# Patient Record
Sex: FEMALE | HISPANIC OR LATINO | URBAN - METROPOLITAN AREA
[De-identification: names, ages, dates, MRNs, and addresses within clinical notes are randomized per-mention and may not be internally consistent; named-entity substitution may affect disease eponyms.]

---

## 2018-04-16 ENCOUNTER — OFFICE (OUTPATIENT)
Dept: URBAN - METROPOLITAN AREA CLINIC 101 | Facility: CLINIC | Age: 63
End: 2018-04-16

## 2018-04-16 VITALS
WEIGHT: 140 LBS | HEIGHT: 62 IN | SYSTOLIC BLOOD PRESSURE: 144 MMHG | HEART RATE: 61 BPM | TEMPERATURE: 97.9 F | DIASTOLIC BLOOD PRESSURE: 86 MMHG

## 2018-04-16 DIAGNOSIS — B19.10 UNSPECIFIED VIRAL HEPATITIS B WITHOUT HEPATIC COMA: ICD-10-CM

## 2018-04-16 DIAGNOSIS — R19.4 CHANGE IN BOWEL HABIT: ICD-10-CM

## 2018-04-16 DIAGNOSIS — R10.13 EPIGASTRIC PAIN: ICD-10-CM

## 2018-04-16 DIAGNOSIS — K59.00 CONSTIPATION, UNSPECIFIED: ICD-10-CM

## 2018-04-16 PROCEDURE — 99244 OFF/OP CNSLTJ NEW/EST MOD 40: CPT

## 2018-04-16 NOTE — SERVICEHPINOTES
FARZANEH TEJEDA   is a   63   year old    female who is being seen in consultation at the request of   GUADALUPE COTE   for epigastric pain radiating to RUQ.    She notes burning sometimes in the middle of the night. She notes increased flatus, which she states can be embarassing. Gaseous foods will cause more issue. Fatty foods may also cause some RUQ discomfort. She has tried tums which has not helped. She has not tried zantac. She has taken a PPI here and there and it does help that day she takes it. She notes constipation with 2 BMs per week. over the past few years. This is different for her as she used to have more regular BMs. She denies any diarrhea, melena, rectal bleeding, weight loss, nausea, vomiting, heartburn, dysphagia, NSAID use. Her appetite can vary. She usually eats dinner around 6 PM and bedtime will be between 9 PM and 11 PM. She denies any family history of GI issues. She had a colonoscopy in 2010 which she states was normal. Dietary review reveals suboptimal fiber intake. She has a history of Hep B diagnosed as a child.

## 2018-04-18 LAB
HBSAG SCREEN: POSITIVE
HBV REAL-TIME PCR, QUANT: HBV IU/ML: 2510 IU/ML
HBV REAL-TIME PCR, QUANT: LOG10 HBV IU/ML: 3.4 LOG10 IU/ML
HBV REAL-TIME PCR, QUANT: TEST INFORMATION: (no result)
HEP B CORE AB, TOT: POSITIVE
HEP B SURFACE AB: HEP B SURFACE AB, QUAL: NON REACTIVE

## 2018-05-11 ENCOUNTER — ON CAMPUS - OUTPATIENT (OUTPATIENT)
Dept: URBAN - METROPOLITAN AREA HOSPITAL 35 | Facility: HOSPITAL | Age: 63
End: 2018-05-11

## 2018-05-11 DIAGNOSIS — R10.11 RIGHT UPPER QUADRANT PAIN: ICD-10-CM

## 2018-05-11 DIAGNOSIS — R10.13 EPIGASTRIC PAIN: ICD-10-CM

## 2018-05-11 DIAGNOSIS — K31.89 OTHER DISEASES OF STOMACH AND DUODENUM: ICD-10-CM

## 2018-05-11 PROCEDURE — 43239 EGD BIOPSY SINGLE/MULTIPLE: CPT
